# Patient Record
Sex: MALE | Race: WHITE | NOT HISPANIC OR LATINO | Employment: FULL TIME | URBAN - METROPOLITAN AREA
[De-identification: names, ages, dates, MRNs, and addresses within clinical notes are randomized per-mention and may not be internally consistent; named-entity substitution may affect disease eponyms.]

---

## 2023-09-18 ENCOUNTER — HOSPITAL ENCOUNTER (EMERGENCY)
Facility: HOSPITAL | Age: 33
Discharge: HOME | End: 2023-09-18
Attending: EMERGENCY MEDICINE | Admitting: EMERGENCY MEDICINE
Payer: OTHER MISCELLANEOUS

## 2023-09-18 VITALS
DIASTOLIC BLOOD PRESSURE: 82 MMHG | HEART RATE: 83 BPM | BODY MASS INDEX: 40.79 KG/M2 | SYSTOLIC BLOOD PRESSURE: 119 MMHG | RESPIRATION RATE: 14 BRPM | OXYGEN SATURATION: 93 % | TEMPERATURE: 98.2 F | HEIGHT: 66 IN

## 2023-09-18 DIAGNOSIS — S39.012A STRAIN OF LUMBAR REGION, INITIAL ENCOUNTER: Primary | ICD-10-CM

## 2023-09-18 PROCEDURE — 99282 EMERGENCY DEPT VISIT SF MDM: CPT

## 2023-09-18 RX ORDER — METHOCARBAMOL 750 MG/1
750 TABLET, FILM COATED ORAL 3 TIMES DAILY PRN
Qty: 15 TABLET | Refills: 0 | Status: SHIPPED | OUTPATIENT
Start: 2023-09-18 | End: 2023-09-23

## 2023-09-18 RX ORDER — METHYLPREDNISOLONE 4 MG/1
TABLET ORAL
Qty: 21 TABLET | Refills: 0 | Status: SHIPPED | OUTPATIENT
Start: 2023-09-18

## 2023-09-18 NOTE — Clinical Note
Psychiatric EMERGENCY ROOM  [address]  [place] [address]  Phone: [phone]    Rubin Blunt was seen and treated in our emergency department on 9/18/2023.  He may return to work on 09/21/2023.         Thank you for choosing [facility].    Gerald Pandey PA-C

## 2023-09-18 NOTE — ED PROVIDER NOTES
Time: 4:35 PM EDT  Date of encounter:  9/18/2023  Independent Historian/Clinical History and Information was obtained by:   Patient    History is limited by: N/A    Chief Complaint: Low back pain      History of Present Illness:  Patient is a 33 y.o. year old male who presents to the emergency department for evaluation of low back pain that began 2 days ago while at work.  Patient states he has been doing a lot of lifting and straining lately.  Patient denies any trauma or pop sensation.  Patient denies urinary/bowel incontinence as well as lower extremity weakness or numbness.  Patient denies dysuria, fever, abdominal pain, chest pain, or shortness of air.    HPI    Patient Care Team  Primary Care Provider: Cathleen Del Cid APRN    Past Medical History:     No Known Allergies  Past Medical History:   Diagnosis Date    Chronic kidney disease     Diabetes mellitus     type 2    Heart failure     Pancreatitis      Past Surgical History:   Procedure Laterality Date    GTUBE INSERTION       Family History   Problem Relation Age of Onset    Colon cancer Neg Hx     Colon polyps Neg Hx     Esophageal cancer Neg Hx        Home Medications:  Prior to Admission medications    Medication Sig Start Date End Date Taking? Authorizing Provider   atorvastatin (LIPITOR) 40 MG tablet Take 1 tablet by mouth. 3/17/23   Celina Hdez MD   brompheniramine-pseudoephedrine-DM 30-2-10 MG/5ML syrup Take 10 mL by mouth 4 (Four) Times a Day As Needed for Allergies. 5/31/23   Ria Arana APRN   carvedilol (COREG) 25 MG tablet  11/5/19   Celina Hdez MD   digoxin (LANOXIN) 125 MCG tablet Take 1 tablet by mouth Daily. 11/5/19   Celina Hdez MD   fenofibrate micronized (LOFIBRA) 134 MG capsule TK 1 C PO D 10/15/19   Celina Hdez MD   guaiFENesin (MUCINEX) 600 MG 12 hr tablet Take 2 tablets by mouth 2 (Two) Times a Day. 5/31/23   Ria Arana APRN   Insulin Glargine (BASAGLAR KWIKPEN) 100  "UNIT/ML injection pen INJECT 40 UNITS SC BID 11/13/19   Celina Hdez MD   Insulin Lispro, 1 Unit Dial, (HUMALOG) 100 UNIT/ML solution pen-injector Take 10 units before each meal + correction scale three times per day. MDD 60 units 3/17/23   Celina Hdez MD   spironolactone (ALDACTONE) 25 MG tablet  3/23/23   Celina Hdez MD   traZODone (DESYREL) 50 MG tablet Take 1 tablet by mouth every night at bedtime. 11/15/19   Celina Hdez MD   VASCEPA 1 g capsule capsule  11/19/19   Celina Hdez MD   vitamin D (ERGOCALCIFEROL) 1.25 MG (97440 UT) capsule capsule TK 1 C PO WEEKLY 11/7/19   Celina Hdez MD        Social History:   Social History     Tobacco Use    Smoking status: Never    Smokeless tobacco: Never   Vaping Use    Vaping Use: Never used   Substance Use Topics    Alcohol use: No    Drug use: Never         Review of Systems:  Review of Systems   Constitutional:  Negative for chills and fever.   HENT:  Negative for congestion, rhinorrhea and sore throat.    Eyes:  Negative for pain and visual disturbance.   Respiratory:  Negative for apnea, cough, chest tightness and shortness of breath.    Cardiovascular:  Negative for chest pain and palpitations.   Gastrointestinal:  Negative for abdominal pain, diarrhea, nausea and vomiting.   Genitourinary:  Negative for difficulty urinating and dysuria.   Musculoskeletal:  Positive for back pain. Negative for joint swelling and myalgias.   Skin:  Negative for color change.   Neurological:  Negative for seizures and headaches.   Psychiatric/Behavioral: Negative.     All other systems reviewed and are negative.     Physical Exam:  /82   Pulse 83   Temp 98.2 °F (36.8 °C) (Oral)   Resp 14   Ht 167.6 cm (66\")   SpO2 93%   BMI 40.79 kg/m²     Physical Exam  Vitals and nursing note reviewed.   Constitutional:       General: He is not in acute distress.     Appearance: Normal appearance. He is not toxic-appearing.   HENT: "      Head: Normocephalic and atraumatic.      Jaw: There is normal jaw occlusion.   Eyes:      General: Lids are normal.      Extraocular Movements: Extraocular movements intact.      Conjunctiva/sclera: Conjunctivae normal.      Pupils: Pupils are equal, round, and reactive to light.   Cardiovascular:      Rate and Rhythm: Normal rate and regular rhythm.      Pulses: Normal pulses.      Heart sounds: Normal heart sounds.   Pulmonary:      Effort: Pulmonary effort is normal. No respiratory distress.      Breath sounds: Normal breath sounds. No wheezing or rhonchi.   Abdominal:      General: Abdomen is flat.      Palpations: Abdomen is soft.      Tenderness: There is no abdominal tenderness. There is no guarding or rebound.   Musculoskeletal:         General: No tenderness. Normal range of motion.      Cervical back: Normal range of motion and neck supple.      Right lower leg: No edema.      Left lower leg: No edema.      Comments: No paraspinal tenderness appreciated upon palpation.  Patient's pain is in right flank and worse with movement.     Skin:     General: Skin is warm and dry.   Neurological:      Mental Status: He is alert and oriented to person, place, and time. Mental status is at baseline.   Psychiatric:         Mood and Affect: Mood normal.                Procedures:  Procedures      Medical Decision Making:      Comorbidities that affect care:    Chronic Kidney Disease, Diabetes    External Notes reviewed:          The following orders were placed and all results were independently analyzed by me:  No orders of the defined types were placed in this encounter.      Medications Given in the Emergency Department:  Medications - No data to display     ED Course:         Labs:    Lab Results (last 24 hours)       ** No results found for the last 24 hours. **             Imaging:    No Radiology Exams Resulted Within Past 24 Hours      Differential Diagnosis and Discussion:    Back Pain: The patient  presents with back pain. My differential diagnosis includes but is not limited to acute spinal epidural abscess, acute spinal epidural bleed, cauda equina syndrome, abdominal aortic aneurysm, aortic dissection, kidney stone, pyelonephritis, musculoskeletal back pain, spinal fracture, and osteoarthritis.     All X-rays impressions were independently interpreted by me.    MDM     There was no paraspinal tenderness upon exam.  Patient's pain seems to be due to muscle spasm.  Patient denies urinary/bowel incontinence as well as saddle anesthesia.  Patient states his flank pain is worse with movement but denies dysuria or fever.  I will place the patient on a Medrol Dosepak as well as a muscle relaxer.  I instructed the patient not to drive after taking the muscle relaxer.  I will write the patient off work for 2 days.  I instructed the patient to return to the ER if he developed intractable pain, difficulty ambulating, developed urinary/bowel incontinence, or saddle anesthesia.  Patient states he understands agrees with plan of care.      Patient Care Considerations:    MRI: I considered ordering an MRI however there was no lumbar paraspinal tenderness on exam nor was there or urinary/bowel incontinence or saddle anesthesia.      Consultants/Shared Management Plan:    None    Social Determinants of Health:    Patient is independent, reliable, and has access to care.       Disposition and Care Coordination:    Discharged: I considered escalation of care by admitting this patient to the hospital, however the patient has improved and is suitable and stable for discharge.    I have explained the patient´s condition, diagnoses and treatment plan based on the information available to me at this time. I have answered questions and addressed any concerns. The patient has a good  understanding of the patient´s diagnosis, condition, and treatment plan as can be expected at this point. The vital signs have been stable. The  patient´s condition is stable and appropriate for discharge from the emergency department.      The patient will pursue further outpatient evaluation with the primary care physician or other designated or consulting physician as outlined in the discharge instructions. They are agreeable to this plan of care and follow-up instructions have been explained in detail. The patient has received these instructions in written format and have expressed an understanding of the discharge instructions. The patient is aware that any significant change in condition or worsening of symptoms should prompt an immediate return to this or the closest emergency department or call to 911.  I have explained discharge medications and the need for follow up with the patient/caretakers. This was also printed in the discharge instructions. Patient was discharged with the following medications and follow up:      Medication List        New Prescriptions      methocarbamol 750 MG tablet  Commonly known as: ROBAXIN  Take 1 tablet by mouth 3 (Three) Times a Day As Needed for Muscle Spasms for up to 5 days.     methylPREDNISolone 4 MG dose pack  Commonly known as: MEDROL  Take as directed on package instructions.               Where to Get Your Medications        These medications were sent to [id number] - [address] [facility] [address]/[facility] & [place] - [phone] [facility] [phone]   [address] [facility] [address]      Phone: [phone]   methocarbamol 750 MG tablet  methylPREDNISolone 4 MG dose pack      Cathleen Del Cid, OLIVER  [address]  [place] [id number] [phone]    Call in 2 days  As needed       Final diagnoses:   Strain of lumbar region, initial encounter        ED Disposition       ED Disposition   Discharge    Condition   Stable    Comment   --               This medical record created using voice recognition software.             Gerald Pandey,  CHAN  09/18/23 1640

## 2024-01-16 PROBLEM — U07.1 COVID-19: Status: ACTIVE | Noted: 2024-01-16

## 2024-01-16 PROCEDURE — 87081 CULTURE SCREEN ONLY: CPT | Performed by: FAMILY MEDICINE

## 2025-03-09 ENCOUNTER — APPOINTMENT (OUTPATIENT)
Dept: GENERAL RADIOLOGY | Facility: HOSPITAL | Age: 35
End: 2025-03-09
Payer: COMMERCIAL

## 2025-03-09 ENCOUNTER — HOSPITAL ENCOUNTER (EMERGENCY)
Facility: HOSPITAL | Age: 35
Discharge: HOME | End: 2025-03-09
Attending: EMERGENCY MEDICINE | Admitting: EMERGENCY MEDICINE
Payer: COMMERCIAL

## 2025-03-09 VITALS
RESPIRATION RATE: 18 BRPM | BODY MASS INDEX: 41.51 KG/M2 | TEMPERATURE: 98.7 F | SYSTOLIC BLOOD PRESSURE: 150 MMHG | HEIGHT: 65 IN | HEART RATE: 116 BPM | DIASTOLIC BLOOD PRESSURE: 94 MMHG | WEIGHT: 249.12 LBS | OXYGEN SATURATION: 98 %

## 2025-03-09 DIAGNOSIS — R07.81 RIB PAIN ON RIGHT SIDE: Primary | ICD-10-CM

## 2025-03-09 DIAGNOSIS — M94.0 COSTOCHONDRITIS: ICD-10-CM

## 2025-03-09 PROCEDURE — 99283 EMERGENCY DEPT VISIT LOW MDM: CPT

## 2025-03-09 PROCEDURE — 71101 X-RAY EXAM UNILAT RIBS/CHEST: CPT

## 2025-03-09 RX ORDER — IBUPROFEN 800 MG/1
800 TABLET, FILM COATED ORAL EVERY 6 HOURS PRN
Qty: 30 TABLET | Refills: 0 | Status: SHIPPED | OUTPATIENT
Start: 2025-03-09

## 2025-03-09 NOTE — DISCHARGE INSTRUCTIONS
Your x-ray completed in the emergency department today does not show any fracture or pneumonia.  Take anti-inflammatory medication as directed for the next week.  Return to the emergency department for fever, chest pain, shortness of breath, other symptoms concerning to you.

## 2025-03-09 NOTE — ED PROVIDER NOTES
Time: 2:09 PM EDT  Date of encounter:  3/9/2025  Independent Historian/Clinical History and Information was obtained by:   Patient    History is limited by: N/A    Chief Complaint: Cough      History of Present Illness:  Patient is a 35 y.o. year old male who presents to the emergency department for evaluation of cough.  Patient states he began having upper respiratory issue last week but feels better from this at this time has not had any fever.  He states he is had a lingering cough.  He states that he had a large cough approximately 2 days ago and when that happened he felt a pop in the right anterior rib and has had pain and tenderness to the touch since that time.  Patient is not a smoker.  Patient does not vape.  Patient denies any shortness of breath but does state the pain is worse with a deep breath.      Patient Care Team  Primary Care Provider: Cathleen Del Cid APRN    Past Medical History:     No Known Allergies  Past Medical History:   Diagnosis Date    Chronic kidney disease     Diabetes mellitus     type 2    Heart failure     Pancreatitis      Past Surgical History:   Procedure Laterality Date    GTUBE INSERTION       Family History   Problem Relation Age of Onset    Colon cancer Neg Hx     Colon polyps Neg Hx     Esophageal cancer Neg Hx        Home Medications:  Prior to Admission medications    Medication Sig Start Date End Date Taking? Authorizing Provider   atorvastatin (LIPITOR) 40 MG tablet Take 1 tablet by mouth. 3/17/23   Celina Hdez MD   benzonatate (TESSALON) 100 MG capsule Take 1 capsule by mouth 3 (Three) Times a Day As Needed for Cough. 1/16/24   Ellie Huff MD   carvedilol (COREG) 25 MG tablet  11/5/19   Celina Hdez MD   Corlanor 5 MG tablet tablet TAKE 1 TABLET BY MOUTH TWICE DAILY. MAKE APPOINTMENT FOR FURTHER REFILLS    Celina Hdez MD   fenofibrate micronized (LOFIBRA) 134 MG capsule TK 1 C PO D 10/15/19   Celina Hdez MD  "  Insulin Glargine (BASAGLAR KWIKPEN) 100 UNIT/ML injection pen INJECT 40 UNITS SC BID 11/13/19   Celina Hdez MD   Insulin Glargine (Lantus SoloStar) 100 UNIT/ML injection pen Inject 40 units under the skin nightly, NEEDS AN APPT FOR FURTHER REFILLS 11/13/23   Celina Hdez MD   Insulin Lispro, 1 Unit Dial, (HUMALOG) 100 UNIT/ML solution pen-injector Take 10 units before each meal + correction scale three times per day. MDD 60 units 3/17/23   Celina Hdez MD   multivitamin with minerals (MULTIPLE VITAMINS-MINERALS PO) Take  by mouth.    Celina Hdez MD   spironolactone (ALDACTONE) 25 MG tablet  3/23/23   Celina Hdez MD   vitamin D (ERGOCALCIFEROL) 1.25 MG (49671 UT) capsule capsule TK 1 C PO WEEKLY 11/7/19   Celina Hdez MD        Social History:   Social History     Tobacco Use    Smoking status: Never    Smokeless tobacco: Never   Vaping Use    Vaping status: Never Used   Substance Use Topics    Alcohol use: No    Drug use: Never         Review of Systems:  Review of Systems   Constitutional:  Negative for fever.   Respiratory:  Positive for cough. Negative for shortness of breath.    Cardiovascular:  Negative for chest pain.        Rib pain        Physical Exam:  /94 (BP Location: Right arm, Patient Position: Sitting)   Pulse 116   Temp 98.7 °F (37.1 °C) (Oral)   Resp 18   Ht 165.1 cm (65\")   Wt 113 kg (249 lb 1.9 oz)   SpO2 98%   BMI 41.46 kg/m²     Physical Exam  Vitals and nursing note reviewed.   Constitutional:       Appearance: Normal appearance. He is normal weight.   HENT:      Head: Normocephalic and atraumatic.      Nose: Nose normal.   Eyes:      Conjunctiva/sclera: Conjunctivae normal.   Cardiovascular:      Rate and Rhythm: Regular rhythm. Tachycardia present.      Heart sounds: Normal heart sounds.   Pulmonary:      Effort: Pulmonary effort is normal.      Breath sounds: Normal breath sounds.   Chest:      Chest wall: Tenderness " present.       Abdominal:      General: Abdomen is flat. There is no distension.   Musculoskeletal:         General: Normal range of motion.      Cervical back: Normal range of motion and neck supple.   Skin:     General: Skin is warm and dry.   Neurological:      General: No focal deficit present.      Mental Status: He is alert and oriented to person, place, and time.   Psychiatric:         Mood and Affect: Mood normal.         Behavior: Behavior normal.         Thought Content: Thought content normal.         Judgment: Judgment normal.                  Medical Decision Making:    Comorbidities that affect care:    Beatties mellitus, CKD, heart failure    External Notes reviewed:    Previous Clinic Note: Patient last seen by family medicine on 3-      The following orders were placed and all results were independently analyzed by me:  Orders Placed This Encounter   Procedures    XR Ribs Right With PA Chest       Medications Given in the Emergency Department:  Medications - No data to display     ED Course:         Labs:    Lab Results (last 24 hours)       ** No results found for the last 24 hours. **             Imaging:    XR Ribs Right With PA Chest  Result Date: 3/9/2025  XR RIBS RIGHT W PA CHEST Date of Exam: 3/9/2025 2:20 PM EDT Indication: Right rib pain anterior after coughing Comparison: None available. Findings: Lungs are adequately expanded and appear clear. No consolidation, pneumothorax, or large pleural fluid collection is seen. Cardiomediastinal contours appear within normal limits. No displaced right rib fractures are identified.     Impression: 1.No acute cardiopulmonary abnormality. 2.No displaced right rib fractures identified. Electronically Signed: Zeny West  3/9/2025 2:38 PM EDT  Workstation ID: [id number]        Differential Diagnosis and Discussion:    Cough: Differential diagnosis includes but is not limited to pneumonia, acute bronchitis, upper respiratory infection, ACE  inhibitor use, allergic reaction, epiglottitis, seasonal allergies, chemical irritants, exercise-induced asthma, viral syndrome.    PROCEDURES:    X-ray were performed in the emergency department and all X-ray impressions were independently interpreted by me.    No orders to display       Procedures    MDM  Number of Diagnoses or Management Options  Costochondritis  Rib pain on right side  Diagnosis management comments: Present to the emergency department today for evaluation of right rib pain after coughing.  X-ray of the right ribs and PA chest was obtained and is negative for any findings.  Patient did have tenderness to palpation over the anterior right ribs.  Will have patient take ibuprofen for treatment at home.  Return to the emergency department guidelines provided.       Amount and/or Complexity of Data Reviewed  Tests in the radiology section of CPT®: reviewed and ordered    Risk of Complications, Morbidity, and/or Mortality  Presenting problems: moderate  Diagnostic procedures: low  Management options: low    Patient Progress  Patient progress: stable       Patient Care Considerations:    Considered EKG however patient's pain is reproducible to palpation      Consultants/Shared Management Plan:    None    Social Determinants of Health:    Patient is independent, reliable, and has access to care.       Disposition and Care Coordination:    Discharged: The patient is suitable and stable for discharge with no need for consideration of admission.    I have explained the patient´s condition, diagnoses and treatment plan based on the information available to me at this time. I have answered questions and addressed any concerns. The patient has a good  understanding of the patient´s diagnosis, condition, and treatment plan as can be expected at this point. The vital signs have been stable. The patient´s condition is stable and appropriate for discharge from the emergency department.      The patient will pursue  further outpatient evaluation with the primary care physician or other designated or consulting physician as outlined in the discharge instructions. They are agreeable to this plan of care and follow-up instructions have been explained in detail. The patient has received these instructions in written format and has expressed an understanding of the discharge instructions. The patient is aware that any significant change in condition or worsening of symptoms should prompt an immediate return to this or the closest emergency department or call to 911.  I have explained discharge medications and the need for follow up with the patient/caretakers. This was also printed in the discharge instructions. Patient was discharged with the following medications and follow up:      Medication List        New Prescriptions      ibuprofen 800 MG tablet  Commonly known as: ADVIL,MOTRIN  Take 1 tablet by mouth Every 6 (Six) Hours As Needed for Mild Pain.               Where to Get Your Medications        These medications were sent to [facility] #[id number] - [place], [place] - [address] - [phone] [facility] [phone]   [address] [place] [address]      Hours: 24-hours Phone: [phone]   ibuprofen 800 MG tablet      Cathleen Del Cid, APRN  [address] [facility] [address]  [facility] [address] [phone]             Final diagnoses:   Rib pain on right side   Costochondritis        ED Disposition       ED Disposition   Discharge    Condition   Stable    Comment   --               This medical record created using voice recognition software.             Viraj Correa PA-C  03/09/25 6434     English